# Patient Record
Sex: FEMALE | Race: WHITE | NOT HISPANIC OR LATINO | URBAN - METROPOLITAN AREA
[De-identification: names, ages, dates, MRNs, and addresses within clinical notes are randomized per-mention and may not be internally consistent; named-entity substitution may affect disease eponyms.]

---

## 2017-12-19 ENCOUNTER — OFFICE (OUTPATIENT)
Dept: URBAN - METROPOLITAN AREA CLINIC 75 | Facility: CLINIC | Age: 42
End: 2017-12-19
Payer: MEDICAID

## 2017-12-19 VITALS
HEIGHT: 66 IN | HEART RATE: 108 BPM | DIASTOLIC BLOOD PRESSURE: 62 MMHG | SYSTOLIC BLOOD PRESSURE: 102 MMHG | WEIGHT: 145 LBS

## 2017-12-19 DIAGNOSIS — K86.1 OTHER CHRONIC PANCREATITIS: ICD-10-CM

## 2017-12-19 DIAGNOSIS — K86.3 PSEUDOCYST OF PANCREAS: ICD-10-CM

## 2017-12-19 DIAGNOSIS — N81.10 CYSTOCELE, UNSPECIFIED: ICD-10-CM

## 2017-12-19 DIAGNOSIS — K57.30 DIVERTICULOSIS OF LARGE INTESTINE WITHOUT PERFORATION OR ABS: ICD-10-CM

## 2017-12-19 DIAGNOSIS — N81.6 RECTOCELE: ICD-10-CM

## 2017-12-19 DIAGNOSIS — K21.9 GASTRO-ESOPHAGEAL REFLUX DISEASE WITHOUT ESOPHAGITIS: ICD-10-CM

## 2017-12-19 PROCEDURE — 99212 OFFICE O/P EST SF 10 MIN: CPT

## 2017-12-19 RX ORDER — ONDANSETRON 8 MG/1
24 TABLET, ORALLY DISINTEGRATING ORAL
Qty: 60 | Refills: 1 | Status: ACTIVE
Start: 2017-12-19

## 2019-04-11 ENCOUNTER — OFFICE VISIT (OUTPATIENT)
Dept: GASTROENTEROLOGY | Facility: CLINIC | Age: 44
End: 2019-04-11

## 2019-04-11 VITALS
SYSTOLIC BLOOD PRESSURE: 122 MMHG | HEIGHT: 66 IN | WEIGHT: 165.2 LBS | DIASTOLIC BLOOD PRESSURE: 80 MMHG | TEMPERATURE: 99 F | BODY MASS INDEX: 26.55 KG/M2

## 2019-04-11 DIAGNOSIS — G89.29 OTHER CHRONIC PAIN: ICD-10-CM

## 2019-04-11 DIAGNOSIS — R14.0 BLOATING: ICD-10-CM

## 2019-04-11 DIAGNOSIS — K59.03 DRUG-INDUCED CONSTIPATION: Primary | ICD-10-CM

## 2019-04-11 PROCEDURE — 99204 OFFICE O/P NEW MOD 45 MIN: CPT | Performed by: INTERNAL MEDICINE

## 2019-04-11 RX ORDER — SUMATRIPTAN 100 MG/1
TABLET, FILM COATED ORAL
Refills: 3 | COMMUNITY
Start: 2019-02-11

## 2019-04-11 RX ORDER — HYDROCODONE BITARTRATE AND ACETAMINOPHEN 10; 325 MG/1; MG/1
1 TABLET ORAL
COMMUNITY

## 2019-04-11 RX ORDER — BACLOFEN 10 MG/1
TABLET ORAL
Refills: 5 | COMMUNITY
Start: 2019-03-18

## 2019-04-11 RX ORDER — LORATADINE 10 MG/1
10 TABLET ORAL DAILY
COMMUNITY

## 2019-04-11 RX ORDER — DEXTROAMPHETAMINE SACCHARATE, AMPHETAMINE ASPARTATE, DEXTROAMPHETAMINE SULFATE AND AMPHETAMINE SULFATE 5; 5; 5; 5 MG/1; MG/1; MG/1; MG/1
1 TABLET ORAL 2 TIMES DAILY
Refills: 0 | COMMUNITY
Start: 2019-03-19

## 2019-04-11 RX ORDER — FLUTICASONE PROPIONATE 50 MCG
SPRAY, SUSPENSION (ML) NASAL
COMMUNITY
Start: 2019-04-08

## 2019-04-11 RX ORDER — RIZATRIPTAN BENZOATE 10 MG/1
10 TABLET, ORALLY DISINTEGRATING ORAL
COMMUNITY
Start: 2019-01-07

## 2019-04-11 RX ORDER — LORAZEPAM 1 MG/1
TABLET ORAL
Refills: 0 | COMMUNITY
Start: 2019-03-19

## 2019-04-11 NOTE — PROGRESS NOTES
Chief Complaint   Patient presents with   • Constipation   • Bloated       Subjective     HPI    Marlee Ariza is a 43 y.o. female with a past medical history noted below who presents for evaluation of constipation.  Symptoms have been present since about 2015 following a fall where she injured her back.  She describes feeling an urge to have a BM but struggles to push the stool out.  She does have daily BMs but only small amounts.  She will have some associated overflow incontinence at times.  She has associated bloating.  She does do well when she makes her own juices from fruits and vegetables and when she eats shredded wheat every morning-- finds this alleviates her symptoms.  He does take 3 hydrocodone 10mg every day.  She thinks she has been on Linzess in the past but it caused more abdominal pain.    She does have issues with urinary incontinence and has been in treatment for this.    She reports testing with Arabella Clemons, including a balloon expulsion testing, colonoscopy with Dr. Randel in the past.    Prior to that time, she would have a daily BM.      No family history of GI malignancies.    She has a history of pancreatitis requiring a stent with Dr Medley This was related to alcohol use though she claims it was due to drinking too much red bowl.    She has been on hydrocodone for a number of years.  She follows with pain management    She is awaiting back and neck surgery    She is currently on disability.      CBC w/ AutoDiffResulted: 6/13/2018 4:03 PM  [facility]  Component Name Value Ref Range   White Blood Count 6.5 4.5 - 11 10*3/uL   Red Blood Count 4.43 4 - 6 10*6/uL   Hemoglobin 15.3 12 - 16 g/dL   Hematocrit 44.6 33 - 51 %   Mean Corpuscular Volume 100.7 (H) 80 - 100 fL   Mean Corpuscular Hemoglobin 34.5 (H) 27 - 31 pg   Mean Corpuscular HGB Conc 34.3 32 - 37 g/dL   Red Cell Distribution Width-CV 12.2 11.6 - 13.7 %   Platelet Count 141 (L) 150 - 450 10*3/uL   Mean Platelet Volume 9.2 7.8 -  11 fL   Diff Type Physician Office CBC w/AutoDiff (arb'U)   Neutrophils % 72.1 45 - 80 %   Lymphocyte % 23.3 20 - 51 %   Mono/Eos/Basophil % 4.6 0.1 - 24 %   Neutrophil Abs 4.70 2 - 8.8 10*3/uL   Lymphocyte-Absolute 1.50 1.2 - 3.4 10*3/uL   Mono/Eos/Basophil-Absolute 0.30 (L) 0.4 - 1 10*3/uL       CMP (Comprehensive Metabolic Panel)Resulted: 6/13/2018 6:51 PM  [facility]  Component Name Value Ref Range   Sodium 137 137 - 145 mmol/L   Potassium 4.0 3.5 - 5.4 mmol/L   Chloride 98 98 - 107 mmol/L   Carbon Dioxide 28 22 - 30 mmol/L   Glucose 101 (H) 74 - 99 mg/dL   Blood Urea Nitrogen (BUN) 6 (L) 7 - 20 mg/dL   Creatinine-Blood 0.5 (L) 0.7 - 1.5 mg/dL   BUN/Creatinine Ratio 13.0 10 - 20 RATIO   Estimated    Comment: Multiply by 1.212 if . Results provided are valid only for patients who are 18 to 70 years of age. Results do not take into account body mass. >60 /1.73 m2   Total Protein 6.4 6.3 - 8.2 g/dL   Albumin 3.9 3.5 - 5 g/dL   Globulin 2.5 1.5 - 4.5 g/dL   Albumin/Globulin Ratio 1.6 1.1 - 2.5 RATIO   Calcium 9.2 8.4 - 10.2 mg/dL   Total Bilirubin 0.3 0.2 - 1.3 mg/dL   AST/SGOT 30 15 - 46 U/L   ALT/SGPT 17 13 - 69 U/L   Alkaline Phosphatase 89 38 - 126 U/L             Past Medical History:   Diagnosis Date   • ADHD    • Dissociative identity disorder (CMS/HCC)    • PTSD (post-traumatic stress disorder)          Current Outpatient Medications:   •  amphetamine-dextroamphetamine (ADDERALL) 20 MG tablet, Take 1 tablet by mouth 2 (Two) Times a Day., Disp: , Rfl: 0  •  baclofen (LIORESAL) 10 MG tablet, TK 1 T PO TID PRN, Disp: , Rfl: 5  •  fluticasone (FLONASE) 50 MCG/ACT nasal spray, SHAKE LIQUID AND USE 1 SPRAY IN EACH NOSTRIL DAILY, Disp: , Rfl:   •  HYDROcodone-acetaminophen (NORCO)  MG per tablet, Take 1 tablet by mouth., Disp: , Rfl:   •  loratadine (CLARITIN) 10 MG tablet, Take 10 mg by mouth Daily., Disp: , Rfl:   •  LORazepam (ATIVAN) 1 MG tablet, TK 1 T PO QID PRN, Disp: ,  Rfl: 0  •  Multiple Vitamins-Minerals (MULTIVITAMIN ADULT PO), Take  by mouth Daily., Disp: , Rfl:   •  rizatriptan MLT (MAXALT-MLT) 10 MG disintegrating tablet, Take 10 mg by mouth., Disp: , Rfl:   •  SUMAtriptan (IMITREX) 100 MG tablet, TK 1 TABLET PO  AS NEEDED FOR MIGRAINE. MAY REPEAT ONCE IN 2 HOURS. MAX 2 PER 24 HOURS., Disp: , Rfl: 3    Allergies   Allergen Reactions   • Morphine GI Intolerance       Social History     Socioeconomic History   • Marital status: Single     Spouse name: Not on file   • Number of children: Not on file   • Years of education: Not on file   • Highest education level: Not on file   Tobacco Use   • Smoking status: Current Some Day Smoker   • Smokeless tobacco: Never Used   • Tobacco comment: occ cigarette    Substance and Sexual Activity   • Alcohol use: Yes     Comment: rare    • Drug use: Yes     Types: Marijuana     Comment: CBD       History reviewed. No pertinent family history.    Review of Systems   Constitutional: Negative for activity change, appetite change and fatigue.   HENT: Negative for sore throat and trouble swallowing.    Respiratory: Negative.    Cardiovascular: Negative.    Gastrointestinal: Positive for abdominal distention and constipation. Negative for abdominal pain and blood in stool.   Endocrine: Negative for cold intolerance and heat intolerance.   Genitourinary: Negative for difficulty urinating, dysuria and frequency.   Musculoskeletal: Positive for arthralgias and back pain. Negative for myalgias.   Skin: Negative.    Hematological: Negative for adenopathy. Does not bruise/bleed easily.   All other systems reviewed and are negative.      Objective     Vitals:    04/11/19 1315   BP: 122/80   Temp: 99 °F (37.2 °C)         04/11/19  1315   Weight: 74.9 kg (165 lb 3.2 oz)     Body mass index is 26.66 kg/m².    Physical Exam   Constitutional: She is oriented to person, place, and time. She appears well-developed and well-nourished. No distress.   HENT:   Head:  Normocephalic and atraumatic.   Right Ear: External ear normal.   Left Ear: External ear normal.   Nose: Nose normal.   Mouth/Throat: Oropharynx is clear and moist.   Eyes: Conjunctivae and EOM are normal. Right eye exhibits no discharge. Left eye exhibits no discharge. No scleral icterus.   Neck: Normal range of motion. Neck supple. No thyromegaly present.   No supraclavicular adenopathy   Cardiovascular: Normal rate, regular rhythm, normal heart sounds and intact distal pulses. Exam reveals no gallop.   No murmur heard.  No lower extremity edema   Pulmonary/Chest: Effort normal and breath sounds normal. No respiratory distress. She has no wheezes.   Abdominal: Soft. Normal appearance and bowel sounds are normal. She exhibits no distension and no mass. There is no hepatosplenomegaly. There is no tenderness. There is no rigidity, no rebound and no guarding. No hernia.   Genitourinary:   Genitourinary Comments: Rectal exam deferred   Musculoskeletal: Normal range of motion. She exhibits no edema or tenderness.   No atrophy of upper or lower extremities.  Normal digits and nails of both hands.  Stiff gait   Lymphadenopathy:     She has no cervical adenopathy.   Neurological: She is alert and oriented to person, place, and time. She displays no atrophy. Coordination normal.   Skin: Skin is warm and dry. No rash noted. She is not diaphoretic. No erythema.   Psychiatric: Her behavior is normal. Judgment and thought content normal.   Odd affect, difficult historian.   Vitals reviewed.      No results found for: WBC, RBC, HGB, HCT, MCV, MCH, MCHC, RDW, RDWSD, MPV, PLT, NEUTRORELPCT, LYMPHORELPCT, MONORELPCT, EOSRELPCT, BASORELPCT, AUTOIGPER, NEUTROABS, LYMPHSABS, MONOSABS, EOSABS, BASOSABS, AUTOIGNUM, NRBC    No results found for: GLUCOSE, NA, K, CO2, CL, ANIONGAP, CREATININE, BUN, BCR, CALCIUM, EGFRIFNONA, ALKPHOS, PROTEINTOT, ALT, AST, BILITOT, ALBUMIN, GLOB, LABIL2      Imaging Results (last 7 days)     ** No results  found for the last 168 hours. **            No notes on file    Assessment/Plan    Constipation: Chronic issue following a back injury.  I suspect a component of dyssynergic defecation following back injury but also complicated by narcotic use.  She has had workup with other gastroenterologist in the past including a balloon expulsion test.  Unfortunately do not have the records at this time    Bloating: With above    Chronic pain: On hydrocodone, awaiting back surgery    Plan  We will get records from [facility] as well as Dr. Randle's office  I have given her some samples of Movantik 85 mg to try daily to see if this helps her bowel movements  Encouraged her to get back on her shredded wheat diet and juicing as this seems to help her have bowel movements  If there is a component of her back injury playing a role in this, then she is unlikely to get better without having a surgery  If there is indeed dyssynergic defecation, then she will need biofeedback, likely at results physiotherapy    Marlee was seen today for constipation and bloated.    Diagnoses and all orders for this visit:    Drug-induced constipation    Bloating        I have discussed the above plan with the patient.  They verbalize understanding and are in agreement with the plan.  They have been advised to contact the office for any questions, concerns, or changes related to their health.    Dictated utilizing Dragon dictation

## 2019-04-11 NOTE — PATIENT INSTRUCTIONS
Try the movantik    I will get records from your past workup    Continue dietary changes     For any additional questions, concerns or changes to your condition after today's office visit please contact the office at [phone].

## 2019-04-12 ENCOUNTER — TELEPHONE (OUTPATIENT)
Dept: GASTROENTEROLOGY | Facility: CLINIC | Age: 44
End: 2019-04-12

## 2019-04-12 NOTE — TELEPHONE ENCOUNTER
Called [facility] at [phone] and spoke with Saadia and requested pt's records.  She states they have not seen the pt since Dec 2017 but she will send office note and c/s to [phone].

## 2019-04-23 ENCOUNTER — TELEPHONE (OUTPATIENT)
Dept: GASTROENTEROLOGY | Facility: CLINIC | Age: 44
End: 2019-04-23

## 2019-04-23 NOTE — TELEPHONE ENCOUNTER
Call to pt.  States Movantik has not helped bowel pattern.  Additionally, has made chronic HA issues worse.  Also has more gas.  Asking what should do now.    Asking if Dr Crespo has been able to review records from [facility], and if that changes recommendations.      Update/questions to Dr Crespo.

## 2019-04-23 NOTE — TELEPHONE ENCOUNTER
----- Message from Arielle Cordoba sent at 4/23/2019 11:49 AM EDT -----  Regarding: CHANGES IN MED   Contact: [phone]  PT STATED MEDICATION IS NOT WORKING AND CAUSE OTHER PROBLEMS .. MOZANTIK 25 MG   ALSO ASKING ABOUT RECORD

## 2019-04-24 NOTE — TELEPHONE ENCOUNTER
Call to pt.  Advise per DR Crespo that records reviewed.  Has rectocele.  Can certainly send to Dr Alison Petersen to determine if surgery is warranted.  If movantik not working, can try samples of linzess 145 mcg.    Advise that samples at  - 1 tablet daily.  Verb understanding.    Very appreciative of above - requests referral.    Message to Scheduling.

## 2019-04-24 NOTE — TELEPHONE ENCOUNTER
Yes I have her records and reviewed them.  She has a rectocele.  I can certainly send her to Dr. Raine Petersen to determine if surgery is warranted.  If the Movantik is not working, we can give her some samples of 145 mcg of Linzess to try, (2 weeks worth)

## 2019-07-01 ENCOUNTER — TELEPHONE (OUTPATIENT)
Dept: GASTROENTEROLOGY | Facility: CLINIC | Age: 44
End: 2019-07-01

## 2019-07-01 DIAGNOSIS — K59.03 DRUG-INDUCED CONSTIPATION: Primary | ICD-10-CM

## 2019-07-01 NOTE — TELEPHONE ENCOUNTER
Called pt and advised that Dr Crespo would like her to have a sitz marker study.  Advised pt she will come to the office to  the sitz marker pill.  Advised there is a $20 charge for the pill.  After taking the pill the pt will have an abd xray in 5 days.  Taking the pill is day 0.  Pt verb understanding and would like to have the test.  Pt states she will try to do this on Wednesday.   KUB ordered.  Update sent to Dr Crespo.

## 2019-07-01 NOTE — TELEPHONE ENCOUNTER
----- Message from Elissa Crespo MD sent at 6/30/2019  1:42 PM EDT -----  Can we pls get her set up for a [facility] study, thx

## 2019-07-09 ENCOUNTER — HOSPITAL ENCOUNTER (OUTPATIENT)
Dept: GENERAL RADIOLOGY | Facility: HOSPITAL | Age: 44
Discharge: HOME | End: 2019-07-09
Admitting: INTERNAL MEDICINE

## 2019-07-09 PROCEDURE — 74018 RADEX ABDOMEN 1 VIEW: CPT

## 2019-07-10 NOTE — TELEPHONE ENCOUNTER
KUB showed no remaining sitzmarks, indicating that she has a normal colon transit time and the issue with stool passage is dyssynergic defecation.  She will need pelvic pt for this.  Pls forward results to her gyn, key

## 2019-07-11 ENCOUNTER — TELEPHONE (OUTPATIENT)
Dept: GASTROENTEROLOGY | Facility: CLINIC | Age: 44
End: 2019-07-11

## 2019-07-11 NOTE — TELEPHONE ENCOUNTER
Called pt and advised per Dr Crespo that her KUB showed no remaining sitzmarks , indication that she has a normal colon transit time and the issue with stool passage is dyssynergic defecation.  She will need pelvic pt for this.      Pt verb understanding and is asking who would order the pelvic pt Dr Crespo or GYN.  ADvised will send message to Dr Crespo.      Copy of results sent to Dr Dudley.

## 2019-07-11 NOTE — TELEPHONE ENCOUNTER
It is my understanding that her gyn is going to be planning workup for her issues, so I recommend going that route first

## 2019-07-11 NOTE — TELEPHONE ENCOUNTER
Called pt and per Dr Crespo that it is her understanding that her gyn is going to be planning workup for her issues, so she recommends she go that route first. Pt verb understanding.

## 2019-07-11 NOTE — TELEPHONE ENCOUNTER
----- Message from Elissa Crespo MD sent at 7/10/2019  6:39 PM EDT -----  KUB showed no remaining sitzmarks, indicating that she has a normal colon transit time and the issue with stool passage is dyssynergic defecation.  She will need pelvic pt for this.  Pls forward results to her gyn, thx

## 2019-07-23 NOTE — TELEPHONE ENCOUNTER
----- Message from Elissa Crespo MD sent at 4/11/2019  4:28 PM EDT -----  Reports testing at [facility] as well as with Dr. Randle; can we please get those records, thanks   No

## 2019-08-23 ENCOUNTER — OFFICE VISIT (OUTPATIENT)
Dept: GASTROENTEROLOGY | Facility: CLINIC | Age: 44
End: 2019-08-23

## 2019-08-23 VITALS
HEIGHT: 66 IN | DIASTOLIC BLOOD PRESSURE: 70 MMHG | BODY MASS INDEX: 27.16 KG/M2 | WEIGHT: 169 LBS | SYSTOLIC BLOOD PRESSURE: 112 MMHG | TEMPERATURE: 98.5 F

## 2019-08-23 DIAGNOSIS — K59.09 CHRONIC CONSTIPATION: Primary | ICD-10-CM

## 2019-08-23 DIAGNOSIS — T85.9XXA DISORDER OF PANCREATIC STENT: ICD-10-CM

## 2019-08-23 DIAGNOSIS — K59.02 DYSSYNERGIC CONSTIPATION: ICD-10-CM

## 2019-08-23 DIAGNOSIS — N81.6 RECTOCELE: ICD-10-CM

## 2019-08-23 PROCEDURE — 99214 OFFICE O/P EST MOD 30 MIN: CPT | Performed by: INTERNAL MEDICINE

## 2019-08-23 RX ORDER — TIZANIDINE 4 MG/1
TABLET ORAL
Refills: 2 | COMMUNITY
Start: 2019-07-29

## 2019-08-23 NOTE — PATIENT INSTRUCTIONS
Will reach out to Dr Medley about the stent removal    Continue dietary management of constipation    Follow up with Dr Dudley    For any additional questions, concerns or changes to your condition after today's office visit please contact the office at [phone].

## 2019-08-23 NOTE — PROGRESS NOTES
"Chief Complaint   Patient presents with   • Constipation   • Diarrhea     Subjective     HPI  Marlee Ariza is a 43 y.o. female who presents for follow up.  She was seen back in April 2019 for issues with constipation, dyssynergic defecation following a back injury.  She has a history of pancreatic stent placement for severe alcoholic pancreatitis with pseudocyst with Dr Medley back in 2016.     She has recently had C spine surgery. She has been following with urogyn for evaluation -- she has a rectocele. She will get an MRI with defecography in a few weeks for further workup.    Sitz-eric study was normal though pancreatic stent apparently still preset, she is getting a CT scan to further assess and has been advise to follow up with Dr Medley.    BMs are \"irregular.\"  Still with struggles to push stool out however BMs are regulated with her diet and she feels that she is doing well with this,      Past Medical History:   Diagnosis Date   • ADHD    • Dissociative identity disorder (CMS/HCC)    • PTSD (post-traumatic stress disorder)        Social History     Socioeconomic History   • Marital status: Single     Spouse name: Not on file   • Number of children: Not on file   • Years of education: Not on file   • Highest education level: Not on file   Tobacco Use   • Smoking status: Current Some Day Smoker   • Smokeless tobacco: Never Used   • Tobacco comment: occ cigarette    Substance and Sexual Activity   • Alcohol use: Yes     Comment: rare    • Drug use: Yes     Types: Marijuana     Comment: CBD         Current Outpatient Medications:   •  fluticasone (FLONASE) 50 MCG/ACT nasal spray, SHAKE LIQUID AND USE 1 SPRAY IN EACH NOSTRIL DAILY, Disp: , Rfl:   •  HYDROcodone-acetaminophen (NORCO)  MG per tablet, Take 1 tablet by mouth., Disp: , Rfl:   •  loratadine (CLARITIN) 10 MG tablet, Take 10 mg by mouth Daily., Disp: , Rfl:   •  LORazepam (ATIVAN) 1 MG tablet, TK 1 T PO PRN, Disp: , Rfl: 0  •  Multiple " Vitamins-Minerals (MULTIVITAMIN ADULT PO), Take  by mouth Daily., Disp: , Rfl:   •  SUMAtriptan (IMITREX) 100 MG tablet, TK 1 TABLET PO  AS NEEDED FOR MIGRAINE. MAY REPEAT ONCE IN 2 HOURS. MAX 2 PER 24 HOURS., Disp: , Rfl: 3  •  tiZANidine (ZANAFLEX) 4 MG tablet, TK 1 T PO TID, Disp: , Rfl: 2  •  amphetamine-dextroamphetamine (ADDERALL) 20 MG tablet, Take 1 tablet by mouth 2 (Two) Times a Day., Disp: , Rfl: 0  •  baclofen (LIORESAL) 10 MG tablet, TK 1 T PO TID PRN, Disp: , Rfl: 5  •  rizatriptan MLT (MAXALT-MLT) 10 MG disintegrating tablet, Take 10 mg by mouth., Disp: , Rfl:     Review of Systems   Constitutional: Negative for activity change, appetite change, chills and fever.   HENT: Negative for trouble swallowing.    Respiratory: Negative.    Cardiovascular: Negative.  Negative for chest pain.   Gastrointestinal: Positive for constipation. Negative for abdominal distention, abdominal pain, anal bleeding, diarrhea, nausea and vomiting.   Genitourinary: Negative for dysuria, frequency and hematuria.       Objective   Vitals:    08/23/19 1555   BP: 112/70   Temp: 98.5 °F (36.9 °C)         08/23/19  1555   Weight: 76.7 kg (169 lb)     Body mass index is 27.28 kg/m².      Physical Exam   Constitutional: She is oriented to person, place, and time. She appears well-developed and well-nourished. No distress.   HENT:   Head: Normocephalic and atraumatic.   Right Ear: External ear normal.   Left Ear: External ear normal.   Nose: Nose normal.   Mouth/Throat: Oropharynx is clear and moist.   Eyes: Conjunctivae and EOM are normal. Right eye exhibits no discharge. Left eye exhibits no discharge. No scleral icterus.   Neck: Normal range of motion. Neck supple. No thyromegaly present.   No supraclavicular adenopathy; surgical scar   Cardiovascular: Normal rate, regular rhythm, normal heart sounds and intact distal pulses. Exam reveals no gallop.   No murmur heard.  No lower extremity edema   Pulmonary/Chest: Effort normal and  breath sounds normal. No respiratory distress. She has no wheezes.   Abdominal: Soft. Normal appearance and bowel sounds are normal. She exhibits no distension and no mass. There is no hepatosplenomegaly. There is no tenderness. There is no rigidity, no rebound and no guarding. No hernia.   Genitourinary:   Genitourinary Comments: Rectal exam deferred   Musculoskeletal: Normal range of motion. She exhibits no edema or tenderness.   No atrophy of upper or lower extremities.  Normal digits and nails of both hands.   Lymphadenopathy:     She has no cervical adenopathy.   Neurological: She is alert and oriented to person, place, and time. She displays no atrophy. Coordination normal.   Skin: Skin is warm and dry. No rash noted. She is not diaphoretic. No erythema.   Psychiatric: Judgment and thought content normal.   Anxious, pressured speech, circumstantial speech   Vitals reviewed.      WBC   Date Value Ref Range Status   07/25/2019 10.65 4.5 - 11.0 10*3/uL Final     RBC   Date Value Ref Range Status   07/25/2019 4.80 4.0 - 5.2 10*6/uL Final     Hemoglobin   Date Value Ref Range Status   07/25/2019 15.8 12.0 - 16.0 g/dL Final     Hematocrit   Date Value Ref Range Status   07/25/2019 47.4 (H) 36.0 - 46.0 % Final     MCV   Date Value Ref Range Status   07/25/2019 98.8 80.0 - 100.0 fL Final     MCH   Date Value Ref Range Status   07/25/2019 32.9 26.0 - 34.0 pg Final     MCHC   Date Value Ref Range Status   07/25/2019 33.3 31.0 - 37.0 g/dL Final     RDW   Date Value Ref Range Status   07/25/2019 13.4 12.0 - 16.8 % Final     MPV   Date Value Ref Range Status   07/25/2019 11.2 (H) 6.7 - 10.8 fL Final     Platelets   Date Value Ref Range Status   07/25/2019 180 140 - 440 10*3/uL Final     Neutrophil Rel %   Date Value Ref Range Status   07/25/2019 77.2 45 - 80 % Final     Lymphocyte Rel %   Date Value Ref Range Status   07/25/2019 13.7 (L) 15 - 50 % Final     Monocyte Rel %   Date Value Ref Range Status   07/25/2019 8.1 0 -  15 % Final     Eosinophil %   Date Value Ref Range Status   07/25/2019 0.3 0 - 7 % Final     Basophil Rel %   Date Value Ref Range Status   07/25/2019 0.2 0 - 2 % Final     Immature Grans %   Date Value Ref Range Status   07/25/2019 0.5 (H) 0 % Final     Neutrophils Absolute   Date Value Ref Range Status   07/25/2019 8.23 2.0 - 8.8 10*3/uL Final     Lymphocytes Absolute   Date Value Ref Range Status   07/25/2019 1.46 0.7 - 5.5 10*3/uL Final     Monocytes Absolute   Date Value Ref Range Status   07/25/2019 0.86 0.0 - 1.7 10*3/uL Final     Eosinophils Absolute   Date Value Ref Range Status   07/25/2019 0.03 0.0 - 0.8 10*3/uL Final     Basophils Absolute   Date Value Ref Range Status   07/25/2019 0.02 0.0 - 0.2 10*3/uL Final     Immature Grans, Absolute   Date Value Ref Range Status   07/25/2019 0.05 <1 10*3/uL Final     nRBC   Date Value Ref Range Status   07/25/2019 0 0 /100(WBC) Final       Lab Results   Component Value Date    BUN 4 (L) 07/25/2019    CREATININE 0.5 (L) 07/25/2019    BCR 7.0 (L) 07/25/2019    CO2 23 07/25/2019    CALCIUM 9.8 07/25/2019    ALBUMIN 4.7 07/25/2019    LABIL2 1.5 07/25/2019    AST 25 07/25/2019    ALT 16 07/25/2019       XR ABDOMEN KUB-  07/09/2019.     HISTORY: Constipation.     FINDINGS: A nonexpandable stent is seen in the upper abdomen extending  from the midline slightly to the right of midline inferiorly. This may  represent some type of nonexpandable biliary or pancreatic type stent.     The bowel gas pattern is unremarkable. There is a small amount of stool  in the rectum.     There is no evidence of fecal impaction or bowel obstruction.     IMPRESSION:  No evidence of fecal impaction or bowel obstruction.     This report was finalized on 7/10/2019 8:29 AM by Dr. Cecilio Cerrato M.D.      Assessment/Plan    Chronic constipation: Suspected dyssynergia/rectocele related.  She is being worked up for this with uro-gyn, doing ok with dietary manipulation    Rectocele:  following  back injury, contributing to the obstructive sensation when she does have bowel movements    Pancreatic stent: This was placed in 2016 and apparently has never been removed/exchanged.  Review of labs show normal transaminases.  She denies any upper abdominal pain    Plan  Continue dietary manipulation for bowel movements    Continue work-up for the rectocele    Follow-up CT imaging to assess his pancreatic stent    She sees Dr. Medley on September 13; we discussed that this stent likely needs to be removed or less likely exchanged.  She does voice some concerns because apparently when it was placed 3 years ago, she was quite confused and apparently left the hospital AMA and she is afraid that the surgeon will be mad at her.  She has had no further pancreatitis episodes though and likely this would just be an outpatient procedure to remove it    Marlee was seen today for constipation and diarrhea.    Diagnoses and all orders for this visit:    Chronic constipation    Rectocele    Dyssynergic constipation    Disorder of pancreatic stent        Dictated utilizing Dragon dictation

## 2019-08-26 ENCOUNTER — TELEPHONE (OUTPATIENT)
Dept: GASTROENTEROLOGY | Facility: CLINIC | Age: 44
End: 2019-08-26

## 2019-08-26 NOTE — TELEPHONE ENCOUNTER
Called pt and advised per Dr Crespo that she needs to f/u with Dr Medley or associate .  Stent needs to be removed by him.  Pt verb understanding.

## 2019-08-26 NOTE — TELEPHONE ENCOUNTER
----- Message from Elissa Crespo MD sent at 8/23/2019  6:04 PM EDT -----  pls advise to follow up with Dr Medley or associate-- stent needs to be removed by him    Thx,  SL

## 2022-11-16 ENCOUNTER — OFFICE VISIT (OUTPATIENT)
Dept: GASTROENTEROLOGY | Facility: CLINIC | Age: 47
End: 2022-11-16

## 2022-11-16 VITALS
HEART RATE: 103 BPM | WEIGHT: 154.6 LBS | SYSTOLIC BLOOD PRESSURE: 96 MMHG | DIASTOLIC BLOOD PRESSURE: 64 MMHG | TEMPERATURE: 97.3 F | HEIGHT: 66 IN | BODY MASS INDEX: 24.85 KG/M2

## 2022-11-16 DIAGNOSIS — Z87.19 HISTORY OF PANCREATITIS: Chronic | ICD-10-CM

## 2022-11-16 DIAGNOSIS — G89.29 OTHER CHRONIC PAIN: Chronic | ICD-10-CM

## 2022-11-16 DIAGNOSIS — R10.10 PAIN OF UPPER ABDOMEN: ICD-10-CM

## 2022-11-16 DIAGNOSIS — R14.0 BLOATING: Chronic | ICD-10-CM

## 2022-11-16 DIAGNOSIS — K59.03 DRUG-INDUCED CONSTIPATION: Chronic | ICD-10-CM

## 2022-11-16 DIAGNOSIS — K59.09 CHRONIC CONSTIPATION: Primary | Chronic | ICD-10-CM

## 2022-11-16 DIAGNOSIS — N81.6 RECTOCELE: Chronic | ICD-10-CM

## 2022-11-16 PROCEDURE — 99204 OFFICE O/P NEW MOD 45 MIN: CPT | Performed by: INTERNAL MEDICINE

## 2022-11-16 RX ORDER — CLONIDINE HYDROCHLORIDE 0.2 MG/1
TABLET ORAL
COMMUNITY
Start: 2022-11-04

## 2022-11-16 RX ORDER — PANCRELIPASE 24000; 76000; 120000 [USP'U]/1; [USP'U]/1; [USP'U]/1
CAPSULE, DELAYED RELEASE PELLETS ORAL
COMMUNITY

## 2022-11-16 RX ORDER — UBROGEPANT 100 MG/1
TABLET ORAL
COMMUNITY
Start: 2022-10-17

## 2022-11-16 RX ORDER — SENNOSIDES 8.6 MG
500 CAPSULE ORAL
COMMUNITY

## 2022-11-16 NOTE — PROGRESS NOTES
Chief Complaint   Patient presents with   • Constipation   • Abdominal Pain   • Gas       Subjective     HPI    Marlee Ariza is a 47 y.o. female with a past medical history noted below who presents for constipation, abdominal pain, and gas.      She was seen on 2 occasions in 2019 for constipation, dyssynergic defecation following a back injury.  She has a history of pancreatic stent placement for severe alcoholic pancreatitis with pseudocyst with Dr Galindo tafoya in 2016.     She says October 22, she was lifting a wheel chair and felt a popping sensation in her back.  Since then, she has felt more abdominal discomfort.  She feels pain on the right side that is more dull and goes up and down the side, sharper pain in the left upper quadrant.    She also reports since that injury, she has had more constipated stools.  Feels like she is struggling to push the stool out and sometimes that she has a fecal impaction with loose stool leaking around a stool impaction    BMs every 2-3 days.  Stools are soft.  Sometimes hard to push out.      Eats a high fiber diet, does not take a fiber supplement.      Last colonoscopy was in 2015    MRI defecography in 2019 notable for rectocele only.    Today's visit was in the office.  Both the patient and I were wearing face masks and proper hand hygiene was performed before and after the physical exam.           Current Outpatient Medications:   •  amphetamine-dextroamphetamine (ADDERALL) 20 MG tablet, Take 1 tablet by mouth 2 (Two) Times a Day., Disp: , Rfl: 0  •  baclofen (LIORESAL) 10 MG tablet, TK 1 T PO TID PRN, Disp: , Rfl: 5  •  fluticasone (FLONASE) 50 MCG/ACT nasal spray, SHAKE LIQUID AND USE 1 SPRAY IN EACH NOSTRIL DAILY, Disp: , Rfl:   •  HYDROcodone-acetaminophen (NORCO)  MG per tablet, Take 1 tablet by mouth., Disp: , Rfl:   •  loratadine (CLARITIN) 10 MG tablet, Take 10 mg by mouth Daily., Disp: , Rfl:   •  LORazepam (ATIVAN) 1 MG tablet, TK 1 T PO PRN, Disp: , Rfl:  0  •  Multiple Vitamins-Minerals (MULTIVITAMIN ADULT PO), Take  by mouth Daily., Disp: , Rfl:   •  acetaminophen (TYLENOL) 650 MG 8 hr tablet, Take 500 mg by mouth., Disp: , Rfl:   •  cloNIDine (CATAPRES) 0.2 MG tablet, , Disp: , Rfl:   •  pancrelipase, Lip-Prot-Amyl, (Creon) 01630-54440 units capsule delayed-release particles capsule, Creon 24,000-76,000-120,000 unit capsule,delayed release, Disp: , Rfl:   •  rizatriptan MLT (MAXALT-MLT) 10 MG disintegrating tablet, Take 10 mg by mouth., Disp: , Rfl:   •  SUMAtriptan (IMITREX) 100 MG tablet, TK 1 TABLET PO  AS NEEDED FOR MIGRAINE. MAY REPEAT ONCE IN 2 HOURS. MAX 2 PER 24 HOURS., Disp: , Rfl: 3  •  tiZANidine (ZANAFLEX) 4 MG tablet, TK 1 T PO TID, Disp: , Rfl: 2  •  ubrogepant 100 MG tablet, TAKE 1 TABLET EVERY 2 HOURS AS NEEDED FOR MIGRAINE --MAY REPEAT DOSE IN 2 HOURS IF NEEDED, Disp: , Rfl:       Objective     Vitals:    11/16/22 1507   BP: 96/64   Pulse: 103   Temp: 97.3 °F (36.3 °C)         11/16/22  1507   Weight: 70.1 kg (154 lb 9.6 oz)     Body mass index is 24.95 kg/m².    Physical Exam        WBC   Date Value Ref Range Status   07/19/2021 9.97 4.5 - 11.0 10*3/uL Final     RBC   Date Value Ref Range Status   07/19/2021 4.33 4.0 - 5.2 10*6/uL Final     Hemoglobin   Date Value Ref Range Status   07/19/2021 13.8 12.0 - 16.0 g/dL Final     Hematocrit   Date Value Ref Range Status   07/19/2021 43.3 36.0 - 46.0 % Final     MCV   Date Value Ref Range Status   07/19/2021 100.0 80.0 - 100.0 fL Final     MCH   Date Value Ref Range Status   07/19/2021 31.9 26.0 - 34.0 pg Final     MCHC   Date Value Ref Range Status   07/19/2021 31.9 31.0 - 37.0 g/dL Final     RDW   Date Value Ref Range Status   07/19/2021 12.9 12.0 - 16.8 % Final     MPV   Date Value Ref Range Status   07/19/2021 10.7 8.4 - 12.4 fL Final     Platelets   Date Value Ref Range Status   07/19/2021 206 140 - 440 10*3/uL Final     Neutrophil Rel %   Date Value Ref Range Status   07/19/2021 74.5 45 - 80 %  Final     Lymphocyte Rel %   Date Value Ref Range Status   07/19/2021 17.4 15 - 50 % Final     Monocyte Rel %   Date Value Ref Range Status   07/19/2021 6.6 0 - 15 % Final     Eosinophil %   Date Value Ref Range Status   07/19/2021 0.3 0 - 7 % Final     Basophil Rel %   Date Value Ref Range Status   07/19/2021 0.5 0 - 2 % Final     Immature Grans %   Date Value Ref Range Status   07/19/2021 0.7 0.0 - 1.0 % Final     Neutrophils Absolute   Date Value Ref Range Status   07/19/2021 7.43 2.0 - 8.8 10*3/uL Final     Lymphocytes Absolute   Date Value Ref Range Status   07/19/2021 1.73 0.7 - 5.5 10*3/uL Final     Monocytes Absolute   Date Value Ref Range Status   07/19/2021 0.66 0.0 - 1.7 10*3/uL Final     Eosinophils Absolute   Date Value Ref Range Status   07/19/2021 0.03 0.0 - 0.8 10*3/uL Final     Basophils Absolute   Date Value Ref Range Status   07/19/2021 0.05 0.0 - 0.2 10*3/uL Final     Immature Grans, Absolute   Date Value Ref Range Status   07/19/2021 0.07 0.00 - 0.10 10*3/uL Final     nRBC   Date Value Ref Range Status   07/19/2021 0 0 /100(WBC) Final       Lab Results   Component Value Date    BUN 9 07/19/2021    CREATININE 0.6 (L) 07/19/2021    BCR 15.0 07/19/2021    CO2 25 07/19/2021    CALCIUM 10.0 07/19/2021    ALBUMIN 5.2 (H) 07/19/2021    LABIL2 2.0 07/19/2021    AST 21 07/19/2021    ALT 15 07/19/2021       MRI spine      IMPRESSION:     No acute findings.     Postsurgical and degenerative findings of the lumbar spine have a similar configuration to the previous MRI dated November 30, 2021.     Dictated by: Rafat Hamilton M.D.     Images and Report reviewed and interpreted by: Rafat Hamilton M.D.     <PS><Electronically signed by: Rafat Hamilton M.D.>   11/09/2022 1502     IMPRESSION:     No acute findings.     Postsurgical and degenerative findings of the lumbar spine have a similar configuration to the previous MRI dated November 30, 2021.     Dictated by: Rafat Hamilton M.D.     Images and Report  reviewed and interpreted by: Rafat Hamilton M.D.     <PS><Electronically signed by: Rafat Hamilton M.D.>   11/09/2022 1502     EXAM: MRI PELVIS WITH DEFECATION     DATE: 08/28/2019     HISTORY: incomplete defecation, rule out intussusception or rectal prolapse.         COMPARISON: None.     TECHNIQUE: Multisequence, multiplanar images of the pelvis were performed without intravenous gadolinium contrast.  Approximately 70 cc of ultrasound gel were inserted into the rectum prior to the examination. Dynamic cine imaging was obtained during   rest, squeeze, and strain and defecation.       FINDINGS:     - Rest:        - H line: 5.2 cm        - M line: 1.5 cm        - Bladder base: 2.8 cm above the PCL        - Cervix: 3.8 cm above the PCL        - Anorectal Angle: 90 degrees       - Maximum Strain/Defecation:        - H line: 6.4 cm        - M line: 3.7 cm        - Bladder base: At the level of the PCL        - Cervix: 1.4 cm above the PCL       - Anorectal Angle: 94 degrees     ADDITIONAL FINDINGS:   There is no evacuation of contrast during maximum straining and defecation. There is no evidence of rectal intussusception or telescoping. A 2.1 cm rectocele is evident. There is no evidence of enterocele or peritoneocele. The puborectalis and   iliococcygeus muscles are normal in appearance.     The uterus measures 6 cm x 2.6 cm x 3.6 cm. There is no focal uterine mass. The endometrium is normal in appearance. The right ovary is normal in appearance and measures 3.5 cm x 2.2 cm x 1 cm. The left ovary is normal in appearance and measures 4.3 cm x    2.2 cm x 1.8 cm. A 2 cm unilocular cyst likely represents a physiologic cyst. The bladder is normal in appearance.     IMPRESSION:     1. Mild descending perineal syndrome. There is no trabeculation of contrast during maximal strain dictation.   2. Small anterior rectocele.     Dictated by: Carlos Alberto Fernandes M.D.     Images and Report reviewed and interpreted by: Carlos Alberto Fernandes M.D.      <PS><Electronically signed by: Carlos Alberto Fernandes M.D.>   08/28/2019 2316         I personally reviewed data as detailed below:     The labs listed above.    The radiology studies listed above.    Office notes from: 11/3/22 pcp note      Assessment and Plan  1.  Chronic constipation: Multifactorial with spinal injury as well as chronic opioid use.  Flare with recent injury    2.  Upper abdominal pain: Since her injury.  Differential of chronic pancreatitis versus constipation versus other    3.  History of pancreatitis: With CT imaging suggesting changes of chronic pancreatitis    4.  Abdominal bloating: Suspect secondary to #1    5.  Rectocele: Multifactorial and also associated with #1    Plan  I recommend that she start daily fiber supplementation with something like FiberCon with 8 ounces of water daily  Will plan for CT imaging of the abdomen pelvis  Continue to monitor symptoms  We discussed likely needing to update colonoscopy for screening purposes as well as her symptoms, will address after CT imaging         Diagnoses and all orders for this visit:    1. Chronic constipation (Primary)  -     CT Abdomen Pelvis With Contrast; Future    2. Drug-induced constipation    3. Rectocele    4. Bloating    5. Other chronic pain    6. History of pancreatitis  -     CT Abdomen Pelvis With Contrast; Future    7. Pain of upper abdomen  -     CT Abdomen Pelvis With Contrast; Future          I have discussed the above plan with the patient.  They verbalize understanding and are in agreement with the plan.  They have been advised to contact the office for any questions, concerns, or changes related to their health.    Dictated utilizing Dragon dictation

## 2022-12-12 ENCOUNTER — HOSPITAL ENCOUNTER (OUTPATIENT)
Dept: CT IMAGING | Facility: HOSPITAL | Age: 47
Discharge: HOME | End: 2022-12-12
Admitting: INTERNAL MEDICINE

## 2022-12-12 DIAGNOSIS — Z87.19 HISTORY OF PANCREATITIS: Chronic | ICD-10-CM

## 2022-12-12 DIAGNOSIS — R10.10 PAIN OF UPPER ABDOMEN: ICD-10-CM

## 2022-12-12 DIAGNOSIS — K59.09 CHRONIC CONSTIPATION: Chronic | ICD-10-CM

## 2022-12-12 PROCEDURE — 25010000002 IOPAMIDOL 61 % SOLUTION: Performed by: INTERNAL MEDICINE

## 2022-12-12 PROCEDURE — 74177 CT ABD & PELVIS W/CONTRAST: CPT

## 2022-12-12 PROCEDURE — 0 DIATRIZOATE MEGLUMINE & SODIUM PER 1 ML: Performed by: INTERNAL MEDICINE

## 2022-12-12 RX ADMIN — DIATRIZOATE MEGLUMINE AND DIATRIZOATE SODIUM 30 ML: 660; 100 LIQUID ORAL; RECTAL at 13:30

## 2022-12-12 RX ADMIN — IOPAMIDOL 85 ML: 612 INJECTION, SOLUTION INTRAVENOUS at 14:34

## 2022-12-14 DIAGNOSIS — R93.5 ABNORMAL CT OF THE ABDOMEN: Primary | ICD-10-CM

## 2022-12-14 NOTE — PROGRESS NOTES
CT imaging demonstrates chronic pancreatitis with calcifications, atrophy of the distal body and irregular beaded appearance of the pancreas.  No inflammation or pseudocyst.    I would like for her to follow-up with Dr. Medley regarding the chronic pancreatitis.    Multiple degenerative changes in the spine    Thickening of the left adrenal gland is noted of unknown certain significance.  I will refer her back to Dr. Mendoza for further evaluation of this, if any.    Narrowing is noted at the portosplenic confluence of uncertain significance.  Will refer to vascular surgery.    No acute processes noted.

## 2022-12-15 ENCOUNTER — TELEPHONE (OUTPATIENT)
Dept: GASTROENTEROLOGY | Facility: CLINIC | Age: 47
End: 2022-12-15

## 2022-12-15 NOTE — TELEPHONE ENCOUNTER
----- Message from Elissa Crespo MD sent at 12/14/2022  5:11 PM EST -----  Can you please send a message to her PCP, Dr. Adriana Mendoza, at [facility] regarding the results of her CT scan and recommendations that she follow-up with Dr. Mendoza about the thickening of the left adrenal gland    Can you please send a referral to Dr. Medley to follow-up with her chronic pancreatitis, thanks

## 2022-12-15 NOTE — TELEPHONE ENCOUNTER
----- Message from Elissa Crespo MD sent at 12/14/2022  5:12 PM EST -----  CT imaging demonstrates chronic pancreatitis with calcifications, atrophy of the distal body and irregular beaded appearance of the pancreas.  No inflammation or pseudocyst.    I would like for her to follow-up with Dr. Medley regarding the chronic pancreatitis.    Multiple degenerative changes in the spine    Thickening of the left adrenal gland is noted of unknown certain significance.  I will refer her back to Dr. Mendoza for further evaluation of this, if any.    Narrowing is noted at the portosplenic confluence of uncertain significance.  Will refer to vascular surgery.    No acute processes noted.

## 2022-12-15 NOTE — TELEPHONE ENCOUNTER
Called pt and advised of Dr Crespo' note. Verb understanding.     Pt reports still having difficulty passing the stool even with the stool softener.  Pt is asking if any of these findings could be causing her issues.  Also pt is asking when should she f/u with her.  Advised will send message to Dr Crespo.

## 2022-12-15 NOTE — TELEPHONE ENCOUNTER
Called Dr Medley's office at [phone] and was on hold for 10 min.     Dr Crespo note with ct scan results faxed to Dr Mendoza's office at [phone] and fax confirmation received.

## 2022-12-16 NOTE — TELEPHONE ENCOUNTER
Stool softener will only keep the stool soft.  If she still having problems passing stool, she is to be on the fiber like we discussed and add in MiraLAX, 1 dose twice daily.  This will be a very mild laxative.  If this does not help her have bowel movements, will need to look towards prescription agents.

## 2022-12-19 ENCOUNTER — TELEPHONE (OUTPATIENT)
Dept: GASTROENTEROLOGY | Facility: CLINIC | Age: 47
End: 2022-12-19

## 2022-12-19 NOTE — TELEPHONE ENCOUNTER
Caller: Marlee Ariza    Relationship: Self    Best call back number: [phone]    What is the best time to reach you: ANY    Who are you requesting to speak with (clinical staff, provider,  specific staff member): CLINICAL    Do you know the name of the person who called: SUDHAKAR JUSTIN    What was the call regarding: PT WAS LEFT VM TO CALL OFFICE     Do you require a callback: YES

## 2022-12-19 NOTE — TELEPHONE ENCOUNTER
Call to pt.  Advise per DR Crespo note.  Verb understanding.     Call to Dr Omega Medley's office and spoke with Crissy.  Per instructions, referral info faxed to [phone].  Awaiting confirmation.

## 2022-12-21 NOTE — TELEPHONE ENCOUNTER
Faxed received from DR Medley's office that pt scheduled for 1/20 - see media tab.      Update to Dr Crespo.